# Patient Record
Sex: FEMALE | Race: WHITE | NOT HISPANIC OR LATINO | Employment: FULL TIME | URBAN - METROPOLITAN AREA
[De-identification: names, ages, dates, MRNs, and addresses within clinical notes are randomized per-mention and may not be internally consistent; named-entity substitution may affect disease eponyms.]

---

## 2023-02-14 PROBLEM — R30.0 DYSURIA: Status: ACTIVE | Noted: 2023-02-14

## 2023-02-14 PROBLEM — M25.541 ARTHRALGIA OF BOTH HANDS: Status: ACTIVE | Noted: 2023-02-14

## 2023-02-14 PROBLEM — E61.2 MAGNESIUM DEFICIENCY: Status: ACTIVE | Noted: 2023-02-14

## 2023-02-14 PROBLEM — M79.89 SWELLING OF LEFT LOWER EXTREMITY: Status: ACTIVE | Noted: 2023-02-14

## 2023-02-14 PROBLEM — B02.9 HERPES ZOSTER WITHOUT COMPLICATION: Status: ACTIVE | Noted: 2023-02-14

## 2023-02-14 PROBLEM — N95.1 MENOPAUSAL SYMPTOMS: Status: ACTIVE | Noted: 2023-02-14

## 2023-02-14 PROBLEM — M96.1 FAILED BACK SYNDROME: Status: ACTIVE | Noted: 2023-02-14

## 2023-02-14 PROBLEM — R10.9 ABDOMINAL DISCOMFORT: Status: ACTIVE | Noted: 2023-02-14

## 2023-02-14 PROBLEM — E55.9 VITAMIN D DEFICIENCY: Status: ACTIVE | Noted: 2023-02-14

## 2023-02-14 PROBLEM — M79.7 FIBROMYALGIA: Status: ACTIVE | Noted: 2023-02-14

## 2023-02-14 PROBLEM — M99.09 SEGMENTAL AND SOMATIC DYSFUNCTION: Status: ACTIVE | Noted: 2023-02-14

## 2023-02-14 PROBLEM — M25.542 ARTHRALGIA OF BOTH HANDS: Status: ACTIVE | Noted: 2023-02-14

## 2023-02-14 PROBLEM — J40 BRONCHITIS: Status: ACTIVE | Noted: 2023-02-14

## 2023-02-14 PROBLEM — R13.10 DYSPHAGIA: Status: ACTIVE | Noted: 2023-02-14

## 2023-02-14 PROBLEM — J01.10 ACUTE NON-RECURRENT FRONTAL SINUSITIS: Status: ACTIVE | Noted: 2023-02-14

## 2023-02-14 PROBLEM — H92.01 RIGHT EAR PAIN: Status: ACTIVE | Noted: 2023-02-14

## 2023-02-14 PROBLEM — R53.83 MALAISE AND FATIGUE: Status: ACTIVE | Noted: 2023-02-14

## 2023-02-14 PROBLEM — M25.562 ACUTE PAIN OF LEFT KNEE: Status: ACTIVE | Noted: 2023-02-14

## 2023-02-14 PROBLEM — L30.9 ECZEMA: Status: ACTIVE | Noted: 2023-02-14

## 2023-02-14 PROBLEM — M54.16 LUMBAR RADICULOPATHY, CHRONIC: Status: ACTIVE | Noted: 2023-02-14

## 2023-02-14 PROBLEM — F51.04 CHRONIC INSOMNIA: Status: ACTIVE | Noted: 2023-02-14

## 2023-02-14 PROBLEM — L25.5 RHUS DERMATITIS: Status: ACTIVE | Noted: 2023-02-14

## 2023-02-14 PROBLEM — M54.9 BACK PAIN: Status: RESOLVED | Noted: 2023-02-14 | Resolved: 2023-02-14

## 2023-02-14 PROBLEM — E53.1 VITAMIN B6 DEFICIENCY: Status: ACTIVE | Noted: 2023-02-14

## 2023-02-14 PROBLEM — M47.816 LUMBAR SPONDYLOSIS: Status: RESOLVED | Noted: 2023-02-14 | Resolved: 2023-02-14

## 2023-02-14 PROBLEM — U07.1 COVID-19 VIRUS INFECTION: Status: ACTIVE | Noted: 2023-02-14

## 2023-02-14 PROBLEM — E53.8 VITAMIN B12 DEFICIENCY: Status: ACTIVE | Noted: 2023-02-14

## 2023-02-14 PROBLEM — R53.81 MALAISE AND FATIGUE: Status: ACTIVE | Noted: 2023-02-14

## 2023-02-14 PROBLEM — Z98.82 H/O BREAST IMPLANT: Status: ACTIVE | Noted: 2023-02-14

## 2023-02-14 PROBLEM — M77.11 LATERAL EPICONDYLITIS OF RIGHT ELBOW: Status: ACTIVE | Noted: 2023-02-14

## 2023-02-14 PROBLEM — I10 BENIGN ESSENTIAL HYPERTENSION: Status: ACTIVE | Noted: 2023-02-14

## 2023-03-20 DIAGNOSIS — F51.04 CHRONIC INSOMNIA: Primary | ICD-10-CM

## 2023-03-20 DIAGNOSIS — M25.562 ACUTE PAIN OF LEFT KNEE: ICD-10-CM

## 2023-03-20 RX ORDER — IBUPROFEN 800 MG/1
800 TABLET ORAL EVERY 8 HOURS PRN
Qty: 60 TABLET | Refills: 3 | Status: SHIPPED | OUTPATIENT
Start: 2023-03-20 | End: 2023-04-19

## 2023-03-20 RX ORDER — LORAZEPAM 2 MG/1
2 TABLET ORAL DAILY
Qty: 30 TABLET | Refills: 0 | Status: SHIPPED | OUTPATIENT
Start: 2023-03-20 | End: 2023-04-18 | Stop reason: SDUPTHER

## 2023-04-05 RX ORDER — ESTRADIOL 0.03 MG/D
1 PATCH TRANSDERMAL
COMMUNITY
Start: 2023-02-21 | End: 2023-06-22 | Stop reason: SDUPTHER

## 2023-04-18 ENCOUNTER — APPOINTMENT (OUTPATIENT)
Dept: PRIMARY CARE | Facility: CLINIC | Age: 53
End: 2023-04-18

## 2023-04-18 DIAGNOSIS — F51.04 CHRONIC INSOMNIA: ICD-10-CM

## 2023-04-18 RX ORDER — LORAZEPAM 2 MG/1
2 TABLET ORAL DAILY
Qty: 30 TABLET | Refills: 0 | Status: SHIPPED | OUTPATIENT
Start: 2023-04-18 | End: 2023-05-17 | Stop reason: SDUPTHER

## 2023-04-18 NOTE — TELEPHONE ENCOUNTER
Kinjal is requesting a refill on ativan she is requesting a 90 day supply since she does not come in monthly anymore.

## 2023-05-17 ENCOUNTER — PROCEDURE VISIT (OUTPATIENT)
Dept: PRIMARY CARE | Facility: CLINIC | Age: 53
End: 2023-05-17

## 2023-05-17 VITALS
DIASTOLIC BLOOD PRESSURE: 77 MMHG | HEIGHT: 71 IN | OXYGEN SATURATION: 98 % | BODY MASS INDEX: 22.54 KG/M2 | HEART RATE: 78 BPM | WEIGHT: 161 LBS | TEMPERATURE: 98.1 F | SYSTOLIC BLOOD PRESSURE: 126 MMHG

## 2023-05-17 DIAGNOSIS — M96.1 FAILED BACK SYNDROME: Primary | ICD-10-CM

## 2023-05-17 DIAGNOSIS — F51.04 CHRONIC INSOMNIA: ICD-10-CM

## 2023-05-17 PROCEDURE — 99212 OFFICE O/P EST SF 10 MIN: CPT | Performed by: FAMILY MEDICINE

## 2023-05-17 RX ORDER — IBUPROFEN 600 MG/1
TABLET ORAL
Qty: 90 TABLET | Refills: 3 | Status: SHIPPED | OUTPATIENT
Start: 2023-05-17 | End: 2024-02-13 | Stop reason: ALTCHOICE

## 2023-05-17 RX ORDER — IBUPROFEN 800 MG/1
TABLET ORAL
COMMUNITY
End: 2023-05-17 | Stop reason: SDUPTHER

## 2023-05-17 RX ORDER — LORAZEPAM 2 MG/1
2 TABLET ORAL DAILY
Qty: 30 TABLET | Refills: 0 | Status: SHIPPED | OUTPATIENT
Start: 2023-05-17 | End: 2023-06-13 | Stop reason: SDUPTHER

## 2023-05-17 NOTE — PROGRESS NOTES
Subjective   Patient ID: Kinjal Cleaning is a 53 y.o. female who presents for omt (adjustment) and lorazepam for anxiety  HPI  OARRS:  Bradley Pretty, DO on 5/17/2023  5:04 PM  I have personally reviewed the OARRS report for Kinjal Cleaning. I have considered the risks of abuse, dependence, addiction and diversion    Is the patient prescribed a combination of a benzodiazepine and opioid?  Yes, I feel it is clincially indicated to continue the medication and have discussed with the patient risks/benefits/alternatives.    Last Urine Drug Screen / ordered today: Yes  No results found for this or any previous visit (from the past 18324 hour(s)).  Results are as expected.     Controlled Substance Agreement:  Date of the Last Agreement: 1 year ago and will do next visit  Reviewed Controlled Substance Agreement including but not limited to the benefits, risks, and alternatives to treatment with a Controlled Substance medication(s).    Benzodiazepines:  What is the patient's goal of therapy? Insomnia and panic attack  Is this being achieved with current treatment? yes    SUDARSHAN-7:  No data recorded    Activities of Daily Living:   Is your overall impression that this patient is benefiting (symptom reduction outweighs side effects) from benzodiazepine therapy? Yes     1. Physical Functioning: Better  2. Family Relationship: Same  3. Social Relationship: Same  4. Mood: Same  5. Sleep Patterns: Better  6. Overall Function: Better  Review of Systems    Objective   Physical Exam  General: Patient is alert and orient x3 appears in no acute distress    Heart: Regular rate and rhythm no murmurs clicks or gallops    Lungs: Clear to auscultation bilateral without [place] wheezing  Assessment/Plan   Problem List Items Addressed This Visit       Failed back syndrome - Primary    Relevant Medications    ibuprofen 600 mg tablet    Chronic insomnia    Relevant Medications    LORazepam (Ativan) 2 mg tablet

## 2023-05-23 ENCOUNTER — TELEPHONE (OUTPATIENT)
Dept: PRIMARY CARE | Facility: CLINIC | Age: 53
End: 2023-05-23

## 2023-05-23 NOTE — TELEPHONE ENCOUNTER
Pt left a message states she was here last week and since then she had a tickle in her throat and largitis the last few days, she woke up today congested and is leaving for vacation Thursday. Asking if you would send something in for her.

## 2023-05-24 DIAGNOSIS — J01.10 ACUTE NON-RECURRENT FRONTAL SINUSITIS: Primary | ICD-10-CM

## 2023-05-24 RX ORDER — DOXYCYCLINE 100 MG/1
100 CAPSULE ORAL 2 TIMES DAILY
Qty: 14 CAPSULE | Refills: 0 | Status: SHIPPED | OUTPATIENT
Start: 2023-05-24 | End: 2023-06-03

## 2023-05-24 RX ORDER — METHYLPREDNISOLONE 4 MG/1
TABLET ORAL
Qty: 21 TABLET | Refills: 0 | Status: SHIPPED | OUTPATIENT
Start: 2023-05-24 | End: 2023-05-31

## 2023-06-13 DIAGNOSIS — F51.04 CHRONIC INSOMNIA: ICD-10-CM

## 2023-06-14 RX ORDER — LORAZEPAM 2 MG/1
2 TABLET ORAL DAILY
Qty: 30 TABLET | Refills: 0 | Status: SHIPPED | OUTPATIENT
Start: 2023-06-14 | End: 2023-08-09 | Stop reason: SDUPTHER

## 2023-06-22 DIAGNOSIS — N95.1 MENOPAUSAL SYMPTOMS: Primary | ICD-10-CM

## 2023-06-22 RX ORDER — ESTRADIOL 0.03 MG/D
1 PATCH TRANSDERMAL
Qty: 4 PATCH | Refills: 11 | Status: SHIPPED | OUTPATIENT
Start: 2023-06-22 | End: 2024-05-22 | Stop reason: SDUPTHER

## 2023-08-09 DIAGNOSIS — F51.04 CHRONIC INSOMNIA: ICD-10-CM

## 2023-08-09 RX ORDER — LORAZEPAM 2 MG/1
2 TABLET ORAL DAILY
Qty: 30 TABLET | Refills: 0 | Status: SHIPPED | OUTPATIENT
Start: 2023-08-09 | End: 2023-09-07 | Stop reason: SDUPTHER

## 2023-08-10 ENCOUNTER — TELEPHONE (OUTPATIENT)
Dept: PRIMARY CARE | Facility: CLINIC | Age: 53
End: 2023-08-10

## 2023-09-07 DIAGNOSIS — F51.04 CHRONIC INSOMNIA: ICD-10-CM

## 2023-09-07 RX ORDER — LORAZEPAM 2 MG/1
2 TABLET ORAL DAILY
Qty: 30 TABLET | Refills: 2 | Status: SHIPPED | OUTPATIENT
Start: 2023-09-07 | End: 2024-02-13 | Stop reason: ALTCHOICE

## 2023-10-11 ENCOUNTER — PROCEDURE VISIT (OUTPATIENT)
Dept: PRIMARY CARE | Facility: CLINIC | Age: 53
End: 2023-10-11

## 2023-10-11 VITALS
HEART RATE: 71 BPM | OXYGEN SATURATION: 99 % | BODY MASS INDEX: 22.54 KG/M2 | WEIGHT: 161 LBS | HEIGHT: 71 IN | TEMPERATURE: 97.1 F

## 2023-10-11 DIAGNOSIS — R79.89 ELEVATED BRAIN NATRIURETIC PEPTIDE (BNP) LEVEL: Primary | ICD-10-CM

## 2023-10-11 PROBLEM — N39.46 MIXED INCONTINENCE: Status: ACTIVE | Noted: 2023-10-11

## 2023-10-11 PROBLEM — N81.11 MIDLINE CYSTOCELE: Status: ACTIVE | Noted: 2023-10-11

## 2023-10-11 PROBLEM — N81.3 COMPLETE UTERINE PROLAPSE: Status: ACTIVE | Noted: 2023-10-11

## 2023-10-11 PROCEDURE — 99212 OFFICE O/P EST SF 10 MIN: CPT | Performed by: FAMILY MEDICINE

## 2023-10-11 RX ORDER — LEVOFLOXACIN 500 MG/1
500 TABLET, FILM COATED ORAL DAILY
COMMUNITY
Start: 2023-01-11 | End: 2024-01-04 | Stop reason: SDUPTHER

## 2023-10-11 RX ORDER — LEVONORGESTREL AND ETHINYL ESTRADIOL 0.15-0.03
KIT ORAL
COMMUNITY
End: 2024-02-13 | Stop reason: ALTCHOICE

## 2023-10-11 RX ORDER — PREDNISONE 10 MG/1
TABLET ORAL
COMMUNITY
Start: 2023-02-08 | End: 2024-02-13 | Stop reason: ALTCHOICE

## 2023-10-11 RX ORDER — FLUTICASONE PROPIONATE 50 MCG
SPRAY, SUSPENSION (ML) NASAL
COMMUNITY
Start: 2023-03-22 | End: 2024-02-13 | Stop reason: ALTCHOICE

## 2023-10-11 RX ORDER — DULOXETIN HYDROCHLORIDE 20 MG/1
1 CAPSULE, DELAYED RELEASE ORAL EVERY 12 HOURS
COMMUNITY
End: 2024-02-13 | Stop reason: ALTCHOICE

## 2023-10-11 RX ORDER — NORTRIPTYLINE HYDROCHLORIDE 25 MG/1
CAPSULE ORAL
COMMUNITY
End: 2024-02-13 | Stop reason: ALTCHOICE

## 2023-10-11 NOTE — PROGRESS NOTES
Subjective   Patient ID: Kinjal Cleaning is a 53 y.o. female who presents for omt (adjustment) and lorazepam for anxiety  HPI  Life insurance policy  Patient is having heavy achy feeling in chest.    SOB but with exertion and recovers quick.      Objective   Physical Exam  General: Patient is alert and orient x3 appears in no acute distress    Neck: no JVD    Heart: Regular rate and rhythm no murmurs clicks or gallops    Lungs: Clear to auscultation bilateral without [place] wheezing    Edema: NO C/C/E  Assessment/Plan   Problem List Items Addressed This Visit    None  No signs of heart failure  Repeat BNP in 3 months  Echo in 3 months

## 2023-11-20 ENCOUNTER — TELEPHONE (OUTPATIENT)
Dept: PRIMARY CARE | Facility: CLINIC | Age: 53
End: 2023-11-20

## 2023-11-20 DIAGNOSIS — J40 BRONCHITIS: Primary | ICD-10-CM

## 2023-11-20 RX ORDER — DOXYCYCLINE 100 MG/1
100 CAPSULE ORAL 2 TIMES DAILY
Qty: 14 CAPSULE | Refills: 0 | Status: SHIPPED | OUTPATIENT
Start: 2023-11-20 | End: 2023-11-27

## 2023-11-20 RX ORDER — METHYLPREDNISOLONE 4 MG/1
TABLET ORAL
Qty: 21 TABLET | Refills: 0 | Status: SHIPPED | OUTPATIENT
Start: 2023-11-20 | End: 2023-11-27

## 2023-11-20 NOTE — TELEPHONE ENCOUNTER
----- Message from Bradley Pretty, DO sent at 11/20/2023 10:11 AM EST -----  Please let meredith know I sent medication to the [facility] drug pharmacy in [place]

## 2023-11-20 NOTE — TELEPHONE ENCOUNTER
----- Message from Bradley Pretty, DO sent at 11/20/2023 10:11 AM EST -----  Please let meredith know I sent medication to the [facility] drug pharmacy in [place]

## 2023-12-18 DIAGNOSIS — J01.10 ACUTE NON-RECURRENT FRONTAL SINUSITIS: Primary | ICD-10-CM

## 2023-12-18 RX ORDER — METHYLPREDNISOLONE 4 MG/1
TABLET ORAL
Qty: 21 TABLET | Refills: 0 | Status: SHIPPED | OUTPATIENT
Start: 2023-12-18 | End: 2023-12-25

## 2023-12-18 RX ORDER — DOXYCYCLINE 100 MG/1
100 CAPSULE ORAL 2 TIMES DAILY
Qty: 20 CAPSULE | Refills: 0 | Status: SHIPPED | OUTPATIENT
Start: 2023-12-18 | End: 2023-12-28

## 2023-12-29 ENCOUNTER — TELEPHONE (OUTPATIENT)
Dept: PRIMARY CARE | Facility: CLINIC | Age: 53
End: 2023-12-29

## 2023-12-29 DIAGNOSIS — J01.10 ACUTE NON-RECURRENT FRONTAL SINUSITIS: Primary | ICD-10-CM

## 2023-12-29 NOTE — TELEPHONE ENCOUNTER
"Patient was prescribed Doxycycline and Medrol dose pack on December 18th for a sinus infection. It has helped some but she is still having drainage, random right ear pain and when she blows her nose that ear \"pops\". When she gets up/down she gets dizzy. Recommendations?   "

## 2024-01-04 RX ORDER — LEVOFLOXACIN 500 MG/1
500 TABLET, FILM COATED ORAL DAILY
Qty: 7 TABLET | Refills: 0 | Status: SHIPPED | OUTPATIENT
Start: 2024-01-04 | End: 2024-01-11

## 2024-02-13 ENCOUNTER — PROCEDURE VISIT (OUTPATIENT)
Dept: PRIMARY CARE | Facility: CLINIC | Age: 54
End: 2024-02-13

## 2024-02-13 VITALS
TEMPERATURE: 97.8 F | OXYGEN SATURATION: 98 % | DIASTOLIC BLOOD PRESSURE: 84 MMHG | SYSTOLIC BLOOD PRESSURE: 130 MMHG | BODY MASS INDEX: 22.54 KG/M2 | HEIGHT: 71 IN | HEART RATE: 95 BPM | WEIGHT: 161 LBS

## 2024-02-13 DIAGNOSIS — M96.1 FAILED BACK SYNDROME: Primary | ICD-10-CM

## 2024-02-13 PROCEDURE — 99212 OFFICE O/P EST SF 10 MIN: CPT | Performed by: FAMILY MEDICINE

## 2024-02-13 ASSESSMENT — PATIENT HEALTH QUESTIONNAIRE - PHQ9
2. FEELING DOWN, DEPRESSED OR HOPELESS: NOT AT ALL
1. LITTLE INTEREST OR PLEASURE IN DOING THINGS: NOT AT ALL
SUM OF ALL RESPONSES TO PHQ9 QUESTIONS 1 AND 2: 0

## 2024-02-13 NOTE — PROGRESS NOTES
Subjective   Patient ID: Kinjal Cleaning is a 54 y.o. female who presents for omt (Adjustment ).  HPI  Patient is coming office today with a chief complaint of low back pain.  No new injuries.  General: Patient is alert and orient x 3 appears in no acute distress    Heart: Regular rate and rhythm no murmurs clicks or gallops    Lungs: Clear to auscultation bilaterally without rhonchi rales or wheezing    Musculoskeletal:  Review of Systems    Objective   Physical Exam  Strength grossly intact at 5 out of 5.  Increased tension throughout the lumbar region  Assessment/Plan   Problem List Items Addressed This Visit       Failed back syndrome - Primary   Osteopathic manipulative therapy was utilized.  Patient tolerated the procedure well.

## 2024-04-25 ENCOUNTER — PROCEDURE VISIT (OUTPATIENT)
Dept: PRIMARY CARE | Facility: CLINIC | Age: 54
End: 2024-04-25

## 2024-04-25 VITALS
BODY MASS INDEX: 22.54 KG/M2 | DIASTOLIC BLOOD PRESSURE: 80 MMHG | SYSTOLIC BLOOD PRESSURE: 110 MMHG | HEART RATE: 103 BPM | TEMPERATURE: 97.3 F | HEIGHT: 71 IN | WEIGHT: 161 LBS | OXYGEN SATURATION: 98 %

## 2024-04-25 DIAGNOSIS — M54.2 CERVICALGIA: Primary | ICD-10-CM

## 2024-04-25 DIAGNOSIS — N95.1 MENOPAUSAL SYMPTOMS: ICD-10-CM

## 2024-04-25 DIAGNOSIS — M96.1 FAILED BACK SYNDROME: ICD-10-CM

## 2024-04-25 PROCEDURE — 99213 OFFICE O/P EST LOW 20 MIN: CPT | Performed by: FAMILY MEDICINE

## 2024-04-25 RX ORDER — IBUPROFEN 800 MG/1
800 TABLET ORAL EVERY 8 HOURS PRN
COMMUNITY
End: 2024-04-25 | Stop reason: SDUPTHER

## 2024-04-25 RX ORDER — IBUPROFEN 800 MG/1
800 TABLET ORAL EVERY 8 HOURS PRN
Qty: 90 TABLET | Refills: 0 | Status: SHIPPED | OUTPATIENT
Start: 2024-04-25 | End: 2024-05-25

## 2024-04-25 RX ORDER — MUPIROCIN 20 MG/G
OINTMENT TOPICAL
COMMUNITY
End: 2024-04-25 | Stop reason: SDUPTHER

## 2024-04-25 RX ORDER — MUPIROCIN 20 MG/G
OINTMENT TOPICAL
Qty: 30 G | Refills: 1 | Status: SHIPPED | OUTPATIENT
Start: 2024-04-25

## 2024-04-25 ASSESSMENT — PATIENT HEALTH QUESTIONNAIRE - PHQ9
1. LITTLE INTEREST OR PLEASURE IN DOING THINGS: NOT AT ALL
SUM OF ALL RESPONSES TO PHQ9 QUESTIONS 1 AND 2: 0
2. FEELING DOWN, DEPRESSED OR HOPELESS: NOT AT ALL

## 2024-04-25 NOTE — PROGRESS NOTES
Subjective   Patient ID: Kinjal Cleaning is a 54 y.o. female who presents for omt (adjustment).  HPI  Neck is having more pain and is worse when lifting neck.  Pain on both sides and has fusion C4-C6.  She feels like she has to lift her head.  No pain nubness or shooting down the arms.   Gaining weight.  She wakes in the AM and feels like she is puffy in the AM.  Has gained 25 pounds.  She is still hiking.  She is sitting more at home.  Menopause.  She is trying to drink more water.  She is having hot flashes.   She is not following any eating lifestyle right now.    Review of Systems    Objective   Physical Exam  General: Patient is alert and orient x3 and appears in no acute distress.  Some pain distress.    Neck: Decreased range of motion in all planes    Heart: Regular rate and rhythm no murmurs clicks or gallops    Lungs: Clear to auscultation bilaterally without rhonchi rales or wheezing      Musculoskeletal: Strength was grossly intact.  Deep tendon reflexes intact.  Sensation intact.  Decreased range of motion    Osteopathic structural:  In the head region there is increased suboccipital tension  In the cervical region C2 extended rotated right side bent left  In the rib region first rib on the left was tender to palpation resisted exhalation  In the upper extremity  right upper extremity was protracted inferior tension of pectoralis minor  In the thoracic region  T2 was extended rotated right side bent right, T7 flexed rotated right side bent right  In the lumbar region L5 extended rotated right side bent right  In the pelvic region  positive pelvic compression test on the right, ASIS inferior the right, PSIS superior on the right  In the sacral region  posterior sacral sulcus on the left, MARIA G posterior on the left, negative spring test  In the lower extremity SCS tenderpoint left piriformis    Assessment/Plan   Problem List Items Addressed This Visit    None  Consiudering sleepo study  Increase the water  Try  menopausal formula.

## 2024-05-22 ENCOUNTER — PROCEDURE VISIT (OUTPATIENT)
Dept: PRIMARY CARE | Facility: CLINIC | Age: 54
End: 2024-05-22

## 2024-05-22 VITALS
HEIGHT: 71 IN | RESPIRATION RATE: 14 BRPM | HEART RATE: 76 BPM | BODY MASS INDEX: 22.54 KG/M2 | SYSTOLIC BLOOD PRESSURE: 122 MMHG | WEIGHT: 161 LBS | OXYGEN SATURATION: 98 % | DIASTOLIC BLOOD PRESSURE: 84 MMHG

## 2024-05-22 DIAGNOSIS — M96.1 FAILED BACK SYNDROME: Primary | ICD-10-CM

## 2024-05-22 DIAGNOSIS — M79.7 FIBROMYALGIA: ICD-10-CM

## 2024-05-22 DIAGNOSIS — N95.1 MENOPAUSAL SYMPTOMS: ICD-10-CM

## 2024-05-22 PROCEDURE — 99213 OFFICE O/P EST LOW 20 MIN: CPT | Performed by: FAMILY MEDICINE

## 2024-05-22 RX ORDER — ESTRADIOL 0.03 MG/D
1 PATCH TRANSDERMAL
Qty: 4 PATCH | Refills: 11 | Status: SHIPPED | OUTPATIENT
Start: 2024-05-26 | End: 2025-04-27

## 2024-05-22 NOTE — PROGRESS NOTES
Subjective   Patient ID: Kinjal Cleaning is a 54 y.o. female who presents for Back Pain.  HPI  Still having back and neck pain.    Review of Systems    Objective   Physical Exam  General: Patient is alert and orient x3 and appears in no acute distress.  Some pain distress.    Neck: Decreased range of motion in all planes    Heart: Regular rate and rhythm no murmurs clicks or gallops    Lungs: Clear to auscultation bilaterally without rhonchi rales or wheezing      Musculoskeletal: Strength was grossly intact.  Deep tendon reflexes intact.  Sensation intact.  Decreased range of motion    Osteopathic structural:  In the head region there is increased suboccipital tension  In the cervical region C2 extended rotated right side bent left  In the rib region first rib on the left was tender to palpation resisted exhalation  In the upper extremity  right upper extremity was protracted inferior tension of pectoralis minor  In the thoracic region  T2 was extended rotated right side bent right, T7 flexed rotated right side bent right  In the lumbar region L5 extended rotated right side bent right  In the pelvic region  positive pelvic compression test on the right, ASIS inferior the right, PSIS superior on the right  In the sacral region  posterior sacral sulcus on the left, MARIA G posterior on the left, negative spring test  In the lower extremity SCS tenderpoint left piriformis    Assessment/Plan   Problem List Items Addressed This Visit       Failed back syndrome - Primary    Fibromyalgia    Menopausal symptoms    Relevant Medications    estradiol (Climara) 0.025 mg/24 hr patch (Start on 5/26/2024)   Used OMT

## 2024-05-28 ENCOUNTER — TELEPHONE (OUTPATIENT)
Dept: PRIMARY CARE | Facility: CLINIC | Age: 54
End: 2024-05-28

## 2024-05-28 DIAGNOSIS — L23.7 POISON IVY DERMATITIS: Primary | ICD-10-CM

## 2024-05-28 RX ORDER — TRIAMCINOLONE ACETONIDE 1 MG/G
CREAM TOPICAL 2 TIMES DAILY
Qty: 30 G | Refills: 0 | Status: SHIPPED | OUTPATIENT
Start: 2024-05-28

## 2024-05-28 RX ORDER — PREDNISONE 10 MG/1
TABLET ORAL DAILY
Qty: 42 TABLET | Refills: 0 | Status: SHIPPED | OUTPATIENT
Start: 2024-05-28 | End: 2024-06-03

## 2024-05-28 NOTE — TELEPHONE ENCOUNTER
Patient was doing yard work this weekend and woke up with poison ivy this morning on her right arm. Patient is asking if you can call something in for her.     [facility], [place]

## 2024-05-28 NOTE — TELEPHONE ENCOUNTER
Pharmacy lm stating prednisone qty was sent for 42 tablets but it should only be 21, do you want to change qty or was it so she could have extra tabs on hand?

## 2024-06-19 ENCOUNTER — APPOINTMENT (OUTPATIENT)
Dept: PRIMARY CARE | Facility: CLINIC | Age: 54
End: 2024-06-19

## 2024-06-19 VITALS
HEART RATE: 88 BPM | BODY MASS INDEX: 22.54 KG/M2 | DIASTOLIC BLOOD PRESSURE: 86 MMHG | OXYGEN SATURATION: 98 % | TEMPERATURE: 98 F | WEIGHT: 161 LBS | HEIGHT: 71 IN | SYSTOLIC BLOOD PRESSURE: 122 MMHG

## 2024-06-19 DIAGNOSIS — M96.1 FAILED BACK SYNDROME: Primary | ICD-10-CM

## 2024-06-19 DIAGNOSIS — M54.2 CERVICALGIA: ICD-10-CM

## 2024-06-19 DIAGNOSIS — M79.7 FIBROMYALGIA: ICD-10-CM

## 2024-06-19 PROCEDURE — 99213 OFFICE O/P EST LOW 20 MIN: CPT | Performed by: FAMILY MEDICINE

## 2024-06-19 ASSESSMENT — PATIENT HEALTH QUESTIONNAIRE - PHQ9
1. LITTLE INTEREST OR PLEASURE IN DOING THINGS: NOT AT ALL
2. FEELING DOWN, DEPRESSED OR HOPELESS: NOT AT ALL
SUM OF ALL RESPONSES TO PHQ9 QUESTIONS 1 AND 2: 0

## 2024-06-24 NOTE — PROGRESS NOTES
Subjective   Patient ID: Kinjal Cleaning is a 54 y.o. female who presents for omt (Adjustment back issues).  HPI  Still having back and neck pain.    Review of Systems    Objective   Physical Exam  General: Patient is alert and orient x3 and appears in no acute distress.  Some pain distress.    Neck: Decreased range of motion in all planes    Heart: Regular rate and rhythm no murmurs clicks or gallops    Lungs: Clear to auscultation bilaterally without rhonchi rales or wheezing      Musculoskeletal: Strength was grossly intact.  Deep tendon reflexes intact.  Sensation intact.  Decreased range of motion    Osteopathic structural:  In the head region there is increased suboccipital tension  In the cervical region C2 extended rotated right side bent left  In the rib region first rib on the left was tender to palpation resisted exhalation  In the upper extremity  right upper extremity was protracted inferior tension of pectoralis minor  In the thoracic region  T2 was extended rotated right side bent right, T7 flexed rotated right side bent right  In the lumbar region L5 extended rotated right side bent right  In the pelvic region  positive pelvic compression test on the right, ASIS inferior the right, PSIS superior on the right  In the sacral region  posterior sacral sulcus on the left, MARIA G posterior on the left, negative spring test  In the lower extremity SCS tenderpoint left piriformis    Assessment/Plan   Problem List Items Addressed This Visit       Failed back syndrome - Primary    Fibromyalgia     Other Visit Diagnoses       Cervicalgia              Used OMT

## 2024-07-15 ENCOUNTER — APPOINTMENT (OUTPATIENT)
Dept: PRIMARY CARE | Facility: CLINIC | Age: 54
End: 2024-07-15

## 2024-08-22 ENCOUNTER — APPOINTMENT (OUTPATIENT)
Dept: PRIMARY CARE | Facility: CLINIC | Age: 54
End: 2024-08-22

## 2024-08-22 VITALS
TEMPERATURE: 98.1 F | BODY MASS INDEX: 22.54 KG/M2 | OXYGEN SATURATION: 97 % | SYSTOLIC BLOOD PRESSURE: 126 MMHG | WEIGHT: 161 LBS | HEIGHT: 71 IN | HEART RATE: 68 BPM | DIASTOLIC BLOOD PRESSURE: 76 MMHG

## 2024-08-22 DIAGNOSIS — M96.1 FAILED BACK SYNDROME: Primary | ICD-10-CM

## 2024-08-22 DIAGNOSIS — M79.7 FIBROMYALGIA: ICD-10-CM

## 2024-08-22 PROCEDURE — 99213 OFFICE O/P EST LOW 20 MIN: CPT | Performed by: FAMILY MEDICINE

## 2024-08-22 RX ORDER — LEVONORGESTREL AND ETHINYL ESTRADIOL 0.15-0.03
KIT ORAL
COMMUNITY

## 2024-08-22 NOTE — PROGRESS NOTES
Subjective   Patient ID: Kinjal Cleaning is a 54 y.o. female who presents for Back Pain (Adjustment for her back and neck pain).  HPI  Still having back and neck pain.    Review of Systems    Objective   Physical Exam  General: Patient is alert and orient x3 and appears in no acute distress.  Some pain distress.    Neck: Decreased range of motion in all planes    Heart: Regular rate and rhythm no murmurs clicks or gallops    Lungs: Clear to auscultation bilaterally without rhonchi rales or wheezing      Musculoskeletal: Strength was grossly intact.  Deep tendon reflexes intact.  Sensation intact.  Decreased range of motion    Osteopathic structural:  In the head region there is increased suboccipital tension  In the cervical region C2 extended rotated right side bent left  In the rib region first rib on the left was tender to palpation resisted exhalation  In the upper extremity  right upper extremity was protracted inferior tension of pectoralis minor  In the thoracic region  T2 was extended rotated right side bent right, T7 flexed rotated right side bent right  In the lumbar region L5 extended rotated right side bent right  In the pelvic region  positive pelvic compression test on the right, ASIS inferior the right, PSIS superior on the right  In the sacral region  posterior sacral sulcus on the left, MARIA G posterior on the left, negative spring test  In the lower extremity SCS tenderpoint left piriformis    Assessment/Plan   Problem List Items Addressed This Visit    None      Used OMT

## 2024-09-23 ENCOUNTER — APPOINTMENT (OUTPATIENT)
Dept: PRIMARY CARE | Facility: CLINIC | Age: 54
End: 2024-09-23

## 2024-09-23 DIAGNOSIS — N95.1 MENOPAUSAL SYMPTOMS: ICD-10-CM

## 2024-09-23 RX ORDER — PROGESTERONE, MICRONIZED 100 %
POWDER (GRAM) MISCELLANEOUS
Qty: 3 G | Refills: 3 | Status: SHIPPED | OUTPATIENT
Start: 2024-09-23 | End: 2024-09-26 | Stop reason: SDUPTHER

## 2024-09-23 RX ORDER — ESTRADIOL 0.04 MG/D
1 PATCH TRANSDERMAL WEEKLY
Qty: 4 PATCH | Refills: 11 | Status: SHIPPED | OUTPATIENT
Start: 2024-09-23 | End: 2025-09-23

## 2024-09-23 NOTE — TELEPHONE ENCOUNTER
Pt lm wondering if you would send a script in for her she is having flu like symptoms that started over the weekend, stuffy nose, cough

## 2024-09-26 RX ORDER — PROGESTERONE, MICRONIZED 100 %
POWDER (GRAM) MISCELLANEOUS
Qty: 3 G | Refills: 3 | Status: SHIPPED | OUTPATIENT
Start: 2024-09-26

## 2024-09-26 NOTE — TELEPHONE ENCOUNTER
Pt left another message stating she has fluid in her ears and is requesting an antibiotic, also her progesterone was sent to a pharmacy in [place] and they can't ship to [place].

## 2024-10-09 DIAGNOSIS — J01.10 ACUTE NON-RECURRENT FRONTAL SINUSITIS: Primary | ICD-10-CM

## 2024-10-09 RX ORDER — DOXYCYCLINE 100 MG/1
100 CAPSULE ORAL 2 TIMES DAILY
Qty: 14 CAPSULE | Refills: 0 | Status: SHIPPED | OUTPATIENT
Start: 2024-10-09 | End: 2024-10-16

## 2024-10-21 ENCOUNTER — APPOINTMENT (OUTPATIENT)
Dept: PRIMARY CARE | Facility: CLINIC | Age: 54
End: 2024-10-21

## 2024-10-23 ENCOUNTER — APPOINTMENT (OUTPATIENT)
Dept: PRIMARY CARE | Facility: CLINIC | Age: 54
End: 2024-10-23

## 2024-10-23 VITALS
SYSTOLIC BLOOD PRESSURE: 142 MMHG | WEIGHT: 161 LBS | RESPIRATION RATE: 16 BRPM | HEART RATE: 78 BPM | OXYGEN SATURATION: 98 % | BODY MASS INDEX: 22.54 KG/M2 | HEIGHT: 71 IN | DIASTOLIC BLOOD PRESSURE: 88 MMHG

## 2024-10-23 DIAGNOSIS — M79.7 FIBROMYALGIA: ICD-10-CM

## 2024-10-23 DIAGNOSIS — M54.2 CERVICALGIA: ICD-10-CM

## 2024-10-23 DIAGNOSIS — M96.1 FAILED BACK SYNDROME: Primary | ICD-10-CM

## 2024-10-23 PROBLEM — J32.9 CHRONIC SINUSITIS: Status: ACTIVE | Noted: 2023-02-27

## 2024-10-23 PROCEDURE — 99213 OFFICE O/P EST LOW 20 MIN: CPT | Performed by: FAMILY MEDICINE

## 2024-10-23 RX ORDER — IBUPROFEN 800 MG/1
800 TABLET ORAL EVERY 8 HOURS PRN
COMMUNITY

## 2024-10-23 ASSESSMENT — ENCOUNTER SYMPTOMS: BACK PAIN: 1

## 2024-10-23 NOTE — PROGRESS NOTES
Subjective   Patient ID: Kinjal Cleaning is a 54 y.o. female who presents for Back Pain.  Back Pain  This is a chronic problem. The current episode started more than 1 month ago. The problem occurs daily. The problem has been waxing and waning since onset. The pain is present in the lumbar spine and thoracic spine. The pain is at a severity of 5/10.       Review of Systems   Musculoskeletal:  Positive for back pain.       Objective   Physical Exam  General: Patient is alert and orient x3 and appears in no acute distress.  Some pain distress.    Neck: Decreased range of motion in all planes    Heart: Regular rate and rhythm no murmurs clicks or gallops    Lungs: Clear to auscultation bilaterally without rhonchi rales or wheezing      Musculoskeletal: Strength was grossly intact.  Deep tendon reflexes intact.  Sensation intact.  Decreased range of motion    Osteopathic structural:  In the head region there is increased suboccipital tension  In the cervical region C2 extended rotated right side bent left  In the rib region first rib on the left was tender to palpation resisted exhalation  In the upper extremity  right upper extremity was protracted inferior tension of pectoralis minor  In the thoracic region  T2 was extended rotated right side bent right, T7 flexed rotated right side bent right  In the lumbar region L5 extended rotated right side bent right  In the pelvic region  positive pelvic compression test on the right, ASIS inferior the right, PSIS superior on the right  In the sacral region  posterior sacral sulcus on the left, MARIA G posterior on the left, negative spring test    Assessment/Plan   Problem List Items Addressed This Visit    None  Osteopathic manipulative therapy was utilized  In the head region as suboccipital release  In the cervical region as functional positional release  In the upper extremity as muscle energy  In the rib region as functional positional release  In the Thoracics as high velocity  low amplitude thrust and muscle energy  In the lumbar region as functional positional release  In the pelvic region as muscle energy  In the sacral region as muscle energy      Patient tolerated the procedure well and noticed improvement after the treatment.    Instructed to drink plenty of water

## 2024-11-18 ENCOUNTER — APPOINTMENT (OUTPATIENT)
Dept: PRIMARY CARE | Facility: CLINIC | Age: 54
End: 2024-11-18

## 2024-11-18 VITALS
TEMPERATURE: 97.1 F | DIASTOLIC BLOOD PRESSURE: 74 MMHG | HEIGHT: 71 IN | SYSTOLIC BLOOD PRESSURE: 128 MMHG | BODY MASS INDEX: 22.54 KG/M2 | HEART RATE: 90 BPM | OXYGEN SATURATION: 95 % | WEIGHT: 161 LBS

## 2024-11-18 DIAGNOSIS — M79.7 FIBROMYALGIA: ICD-10-CM

## 2024-11-18 DIAGNOSIS — M96.1 FAILED BACK SYNDROME: Primary | ICD-10-CM

## 2024-11-18 PROCEDURE — 99213 OFFICE O/P EST LOW 20 MIN: CPT | Performed by: FAMILY MEDICINE

## 2024-11-18 ASSESSMENT — PATIENT HEALTH QUESTIONNAIRE - PHQ9
2. FEELING DOWN, DEPRESSED OR HOPELESS: NOT AT ALL
SUM OF ALL RESPONSES TO PHQ9 QUESTIONS 1 AND 2: 0
1. LITTLE INTEREST OR PLEASURE IN DOING THINGS: NOT AT ALL

## 2024-11-19 DIAGNOSIS — M54.2 CERVICALGIA: ICD-10-CM

## 2024-11-19 RX ORDER — MUPIROCIN 20 MG/G
OINTMENT TOPICAL
Qty: 22 G | Refills: 1 | Status: SHIPPED | OUTPATIENT
Start: 2024-11-19

## 2024-11-19 ASSESSMENT — ENCOUNTER SYMPTOMS: BACK PAIN: 1

## 2024-11-19 NOTE — PROGRESS NOTES
Subjective   Patient ID: Kinjal Cleaning is a 54 y.o. female who presents for Back Pain (Adjustment for back pain).  Back Pain  This is a chronic problem. The current episode started more than 1 month ago. The problem occurs daily. The problem has been waxing and waning since onset. The pain is present in the lumbar spine and thoracic spine. The pain is at a severity of 5/10.       Review of Systems   Musculoskeletal:  Positive for back pain.       Objective   Physical Exam  General: Patient is alert and orient x3 and appears in no acute distress.  Some pain distress.    Neck: Decreased range of motion in all planes    Heart: Regular rate and rhythm no murmurs clicks or gallops    Lungs: Clear to auscultation bilaterally without rhonchi rales or wheezing      Musculoskeletal: Strength was grossly intact.  Deep tendon reflexes intact.  Sensation intact.  Decreased range of motion    Osteopathic structural:  In the head region there is increased suboccipital tension  In the cervical region C2 extended rotated right side bent left  In the rib region first rib on the left was tender to palpation resisted exhalation  In the upper extremity  right upper extremity was protracted inferior tension of pectoralis minor  In the thoracic region  T2 was extended rotated right side bent right, T7 flexed rotated right side bent right  In the lumbar region L5 extended rotated right side bent right  In the pelvic region  positive pelvic compression test on the right, ASIS inferior the right, PSIS superior on the right  In the sacral region  posterior sacral sulcus on the left, MARIA G posterior on the left, negative spring test    Assessment/Plan   Problem List Items Addressed This Visit       Failed back syndrome - Primary    Fibromyalgia   Osteopathic manipulative therapy was utilized  In the head region as suboccipital release  In the cervical region as functional positional release  In the upper extremity as muscle energy  In the rib  region as functional positional release  In the Thoracics as high velocity low amplitude thrust and muscle energy  In the lumbar region as functional positional release  In the pelvic region as muscle energy  In the sacral region as muscle energy      Patient tolerated the procedure well and noticed improvement after the treatment.    Instructed to drink plenty of water

## 2024-12-18 ENCOUNTER — APPOINTMENT (OUTPATIENT)
Dept: PRIMARY CARE | Facility: CLINIC | Age: 54
End: 2024-12-18

## 2024-12-18 VITALS
HEART RATE: 82 BPM | DIASTOLIC BLOOD PRESSURE: 78 MMHG | RESPIRATION RATE: 16 BRPM | HEIGHT: 71 IN | WEIGHT: 161 LBS | SYSTOLIC BLOOD PRESSURE: 116 MMHG | OXYGEN SATURATION: 98 % | BODY MASS INDEX: 22.54 KG/M2

## 2024-12-18 DIAGNOSIS — M79.7 FIBROMYALGIA: ICD-10-CM

## 2024-12-18 DIAGNOSIS — M96.1 FAILED BACK SYNDROME: Primary | ICD-10-CM

## 2024-12-18 PROCEDURE — 99213 OFFICE O/P EST LOW 20 MIN: CPT | Performed by: FAMILY MEDICINE

## 2024-12-20 ASSESSMENT — ENCOUNTER SYMPTOMS: BACK PAIN: 1

## 2025-01-13 ENCOUNTER — APPOINTMENT (OUTPATIENT)
Dept: PRIMARY CARE | Facility: CLINIC | Age: 55
End: 2025-01-13

## 2025-01-13 ENCOUNTER — TELEPHONE (OUTPATIENT)
Dept: PRIMARY CARE | Facility: CLINIC | Age: 55
End: 2025-01-13

## 2025-01-13 VITALS
HEIGHT: 71 IN | TEMPERATURE: 97.6 F | OXYGEN SATURATION: 100 % | DIASTOLIC BLOOD PRESSURE: 86 MMHG | HEART RATE: 64 BPM | WEIGHT: 161 LBS | BODY MASS INDEX: 22.54 KG/M2 | SYSTOLIC BLOOD PRESSURE: 124 MMHG

## 2025-01-13 DIAGNOSIS — M79.7 FIBROMYALGIA: ICD-10-CM

## 2025-01-13 DIAGNOSIS — J40 BRONCHITIS: ICD-10-CM

## 2025-01-13 DIAGNOSIS — J40 BRONCHITIS: Primary | ICD-10-CM

## 2025-01-13 DIAGNOSIS — M54.2 CERVICALGIA: ICD-10-CM

## 2025-01-13 DIAGNOSIS — M96.1 FAILED BACK SYNDROME: Primary | ICD-10-CM

## 2025-01-13 PROCEDURE — 99213 OFFICE O/P EST LOW 20 MIN: CPT | Performed by: FAMILY MEDICINE

## 2025-01-13 RX ORDER — ALBUTEROL SULFATE AND BUDESONIDE 90; 80 UG/1; UG/1
2 AEROSOL, METERED RESPIRATORY (INHALATION) EVERY 6 HOURS PRN
Qty: 5.9 G | Refills: 0 | Status: SHIPPED | OUTPATIENT
Start: 2025-01-13

## 2025-01-13 RX ORDER — DOXYCYCLINE 100 MG/1
100 CAPSULE ORAL 2 TIMES DAILY
Qty: 14 CAPSULE | Refills: 0 | Status: SHIPPED | OUTPATIENT
Start: 2025-01-13 | End: 2025-01-20

## 2025-01-13 ASSESSMENT — ENCOUNTER SYMPTOMS: BACK PAIN: 1

## 2025-01-13 NOTE — PROGRESS NOTES
Subjective   Patient ID: Kinjal Cleaning is a 54 y.o. female who presents for Back Pain (Adjustment for back pain).  Back Pain  This is a chronic problem. The current episode started more than 1 month ago. The problem occurs daily. The problem has been waxing and waning since onset. The pain is present in the lumbar spine and thoracic spine. The pain is at a severity of 5/10.       Review of Systems   Musculoskeletal:  Positive for back pain.       Objective   Physical Exam  General: Patient is alert and orient x3 and appears in no acute distress.  Some pain distress.    Neck: Decreased range of motion in all planes    Heart: Regular rate and rhythm no murmurs clicks or gallops    Lungs: Clear to auscultation bilaterally without rhonchi rales or wheezing      Musculoskeletal: Strength was grossly intact.  Deep tendon reflexes intact.  Sensation intact.  Decreased range of motion    Osteopathic structural:  In the head region there is increased suboccipital tension  In the cervical region C2 extended rotated right side bent left  In the rib region first rib on the left was tender to palpation resisted exhalation  In the upper extremity  right upper extremity was protracted inferior tension of pectoralis minor  In the thoracic region  T2 was extended rotated right side bent right, T7 flexed rotated right side bent right  In the lumbar region L5 extended rotated right side bent right  In the pelvic region  positive pelvic compression test on the right, ASIS inferior the right, PSIS superior on the right  In the sacral region  posterior sacral sulcus on the left, MARIA G posterior on the left, negative spring test    Assessment/Plan   Problem List Items Addressed This Visit       Failed back syndrome - Primary    Fibromyalgia     Other Visit Diagnoses       Cervicalgia              Osteopathic manipulative therapy was utilized  In the head region as suboccipital release  In the cervical region as functional positional  release  In the upper extremity as muscle energy  In the rib region as functional positional release  In the Thoracics as high velocity low amplitude thrust and muscle energy  In the lumbar region as functional positional release  In the pelvic region as muscle energy  In the sacral region as muscle energy      Patient tolerated the procedure well and noticed improvement after the treatment.    Instructed to drink plenty of water

## 2025-01-13 NOTE — TELEPHONE ENCOUNTER
Pt sanket stating the inhaler that was sent in was $500, is there anything else you can send for her?

## 2025-01-14 RX ORDER — ALBUTEROL SULFATE 90 UG/1
2 INHALANT RESPIRATORY (INHALATION) EVERY 4 HOURS PRN
Qty: 8 G | Refills: 0 | Status: SHIPPED | OUTPATIENT
Start: 2025-01-14 | End: 2026-01-14

## 2025-01-14 NOTE — TELEPHONE ENCOUNTER
Kinjal called again stating that the inhaler that you sent in for her and her mom (Soha Navarrete) is gonna cost $500 . She wants to know if there is something else that you could call in for them?

## 2025-02-06 ENCOUNTER — APPOINTMENT (OUTPATIENT)
Dept: PRIMARY CARE | Facility: CLINIC | Age: 55
End: 2025-02-06

## 2025-02-06 DIAGNOSIS — M96.1 FAILED BACK SYNDROME: Primary | ICD-10-CM

## 2025-02-06 DIAGNOSIS — M79.7 FIBROMYALGIA: ICD-10-CM

## 2025-02-06 PROCEDURE — 99213 OFFICE O/P EST LOW 20 MIN: CPT | Performed by: FAMILY MEDICINE

## 2025-02-06 ASSESSMENT — ENCOUNTER SYMPTOMS: BACK PAIN: 1

## 2025-02-06 NOTE — PROGRESS NOTES
Subjective   Patient ID: Kinjal Cleaning is a 54 y.o. female who presents for No chief complaint on file..  Back Pain  This is a chronic problem. The current episode started more than 1 month ago. The problem occurs daily. The problem has been waxing and waning since onset. The pain is present in the lumbar spine and thoracic spine. The pain is at a severity of 5/10.       Review of Systems   Musculoskeletal:  Positive for back pain.       Objective   Physical Exam  General: Patient is alert and orient x3 and appears in no acute distress.  Some pain distress.    Neck: Decreased range of motion in all planes    Heart: Regular rate and rhythm no murmurs clicks or gallops    Lungs: Clear to auscultation bilaterally without rhonchi rales or wheezing      Musculoskeletal: Strength was grossly intact.  Deep tendon reflexes intact.  Sensation intact.  Decreased range of motion    Osteopathic structural:  In the head region there is increased suboccipital tension  In the cervical region C2 extended rotated right side bent left  In the rib region first rib on the left was tender to palpation resisted exhalation  In the upper extremity  right upper extremity was protracted inferior tension of pectoralis minor  In the thoracic region  T2 was extended rotated right side bent right, T7 flexed rotated right side bent right  In the lumbar region L5 extended rotated right side bent right  In the pelvic region  positive pelvic compression test on the right, ASIS inferior the right, PSIS superior on the right  In the sacral region  posterior sacral sulcus on the left, MARIA G posterior on the left, negative spring test    Assessment/Plan   Problem List Items Addressed This Visit       Failed back syndrome - Primary    Fibromyalgia       Osteopathic manipulative therapy was utilized  In the head region as suboccipital release  In the cervical region as functional positional release  In the upper extremity as muscle energy  In the rib region as  functional positional release  In the Thoracics as high velocity low amplitude thrust and muscle energy  In the lumbar region as functional positional release  In the pelvic region as muscle energy  In the sacral region as muscle energy      Patient tolerated the procedure well and noticed improvement after the treatment.    Instructed to drink plenty of water

## 2025-02-13 ENCOUNTER — APPOINTMENT (OUTPATIENT)
Dept: PRIMARY CARE | Facility: CLINIC | Age: 55
End: 2025-02-13

## 2025-02-13 ENCOUNTER — TELEPHONE (OUTPATIENT)
Dept: PRIMARY CARE | Facility: CLINIC | Age: 55
End: 2025-02-13

## 2025-02-13 DIAGNOSIS — R10.9 ABDOMINAL DISCOMFORT: Primary | ICD-10-CM

## 2025-02-13 DIAGNOSIS — J40 BRONCHITIS: ICD-10-CM

## 2025-02-13 RX ORDER — ALBUTEROL SULFATE AND BUDESONIDE 90; 80 UG/1; UG/1
2 AEROSOL, METERED RESPIRATORY (INHALATION) EVERY 6 HOURS PRN
Qty: 5.9 G | Refills: 0 | Status: SHIPPED | OUTPATIENT
Start: 2025-02-13

## 2025-02-13 RX ORDER — DOXYCYCLINE 100 MG/1
100 CAPSULE ORAL 2 TIMES DAILY
Qty: 14 CAPSULE | Refills: 0 | Status: SHIPPED | OUTPATIENT
Start: 2025-02-13 | End: 2025-02-20

## 2025-02-13 NOTE — TELEPHONE ENCOUNTER
Pt lm stating she has been sick since Sunday, she feels like there is rattling in her lungs, she is wondering if you could send something in for her? She has been using her inhaler but it is not really helping.

## 2025-02-14 NOTE — TELEPHONE ENCOUNTER
Patient left a voicemail and stated the pharmacy never got anything from the office with the billing information. I spoke with Kia our drug rep and she advised me that I give them the saving card information and that the pharmacy will still need to call their cooperate office to get an over-ride code. Spoke with the pharmacy advised the pharmacist of this and provided him the savings card info. He took it and stated he will take care of it. Patient advised.

## 2025-02-14 NOTE — TELEPHONE ENCOUNTER
[facility] called and stated he spoke to cooperate and they have to have a primary to bill, spoke with Kia and she directed me to the website to download a different time of savings card. [facility] still couldn't get the claim to go through I faxed her the copy of  the savings card with the instructions on how to submit.

## 2025-03-10 ENCOUNTER — APPOINTMENT (OUTPATIENT)
Dept: PRIMARY CARE | Facility: CLINIC | Age: 55
End: 2025-03-10

## 2025-03-10 VITALS
DIASTOLIC BLOOD PRESSURE: 72 MMHG | BODY MASS INDEX: 22.54 KG/M2 | HEART RATE: 73 BPM | HEIGHT: 71 IN | OXYGEN SATURATION: 99 % | WEIGHT: 161 LBS | TEMPERATURE: 97.8 F | SYSTOLIC BLOOD PRESSURE: 102 MMHG

## 2025-03-10 DIAGNOSIS — M96.1 FAILED BACK SYNDROME: Primary | ICD-10-CM

## 2025-03-10 PROCEDURE — 99213 OFFICE O/P EST LOW 20 MIN: CPT | Performed by: FAMILY MEDICINE

## 2025-03-10 ASSESSMENT — ENCOUNTER SYMPTOMS: BACK PAIN: 1

## 2025-03-10 NOTE — PROGRESS NOTES
Subjective   Patient ID: Kinjal Cleaning is a 55 y.o. female who presents for Back Pain (Adjustment for back pain).  Back Pain  This is a chronic problem. The current episode started more than 1 month ago. The problem occurs daily. The problem has been waxing and waning since onset. The pain is present in the lumbar spine and thoracic spine. The pain is at a severity of 5/10.       Review of Systems   Musculoskeletal:  Positive for back pain.       Objective   Physical Exam  General: Patient is alert and orient x3 and appears in no acute distress.  Some pain distress.    Neck: Decreased range of motion in all planes    Heart: Regular rate and rhythm no murmurs clicks or gallops    Lungs: Clear to auscultation bilaterally without rhonchi rales or wheezing      Musculoskeletal: Strength was grossly intact.  Deep tendon reflexes intact.  Sensation intact.  Decreased range of motion    Osteopathic structural:  In the head region there is increased suboccipital tension  In the cervical region C2 extended rotated right side bent left  In the rib region first rib on the left was tender to palpation resisted exhalation  In the upper extremity  right upper extremity was protracted inferior tension of pectoralis minor  In the thoracic region  T2 was extended rotated right side bent right, T7 flexed rotated right side bent right  In the lumbar region L5 extended rotated right side bent right  In the pelvic region  positive pelvic compression test on the right, ASIS inferior the right, PSIS superior on the right  In the sacral region  posterior sacral sulcus on the left, MARIA G posterior on the left, negative spring test    Assessment/Plan   Problem List Items Addressed This Visit    None        Osteopathic manipulative therapy was utilized  In the head region as suboccipital release  In the cervical region as functional positional release  In the upper extremity as muscle energy  In the rib region as functional positional release  In  the Thoracics as high velocity low amplitude thrust and muscle energy  In the lumbar region as functional positional release  In the pelvic region as muscle energy  In the sacral region as muscle energy      Patient tolerated the procedure well and noticed improvement after the treatment.    Instructed to drink plenty of water

## 2025-04-21 ENCOUNTER — APPOINTMENT (OUTPATIENT)
Dept: PRIMARY CARE | Facility: CLINIC | Age: 55
End: 2025-04-21

## 2025-04-21 VITALS
DIASTOLIC BLOOD PRESSURE: 76 MMHG | WEIGHT: 161 LBS | OXYGEN SATURATION: 97 % | HEIGHT: 71 IN | HEART RATE: 81 BPM | TEMPERATURE: 97.6 F | BODY MASS INDEX: 22.54 KG/M2 | SYSTOLIC BLOOD PRESSURE: 122 MMHG

## 2025-04-21 DIAGNOSIS — M96.1 FAILED BACK SYNDROME: Primary | ICD-10-CM

## 2025-04-21 DIAGNOSIS — N95.1 MENOPAUSAL SYMPTOMS: ICD-10-CM

## 2025-04-21 PROCEDURE — 99213 OFFICE O/P EST LOW 20 MIN: CPT | Performed by: FAMILY MEDICINE

## 2025-04-21 RX ORDER — PROGESTERONE 100 MG/1
CAPSULE ORAL
COMMUNITY
Start: 2025-03-19

## 2025-04-21 RX ORDER — PROGESTERONE, MICRONIZED 100 %
POWDER (GRAM) MISCELLANEOUS
Qty: 3 G | Refills: 3 | Status: SHIPPED | OUTPATIENT
Start: 2025-04-21

## 2025-04-21 ASSESSMENT — ENCOUNTER SYMPTOMS: BACK PAIN: 1

## 2025-04-21 NOTE — PROGRESS NOTES
Subjective   Patient ID: Kinjal Cleaning is a 55 y.o. female who presents for Back Pain (Adjustment for back pain).  Back Pain  This is a chronic problem. The current episode started more than 1 month ago. The problem occurs daily. The problem has been waxing and waning since onset. The pain is present in the lumbar spine and thoracic spine. The pain is at a severity of 5/10.       Review of Systems   Musculoskeletal:  Positive for back pain.       Objective   Physical Exam  General: Patient is alert and orient x3 and appears in no acute distress.  Some pain distress.    Neck: Decreased range of motion in all planes    Heart: Regular rate and rhythm no murmurs clicks or gallops    Lungs: Clear to auscultation bilaterally without rhonchi rales or wheezing      Musculoskeletal: Strength was grossly intact.  Deep tendon reflexes intact.  Sensation intact.  Decreased range of motion    Osteopathic structural:  In the head region there is increased suboccipital tension  In the cervical region C2 extended rotated right side bent left  In the rib region first rib on the left was tender to palpation resisted exhalation  In the upper extremity  right upper extremity was protracted inferior tension of pectoralis minor  In the thoracic region  T2 was extended rotated right side bent right, T7 flexed rotated right side bent right  In the lumbar region L5 extended rotated right side bent right  In the pelvic region  positive pelvic compression test on the right, ASIS inferior the right, PSIS superior on the right  In the sacral region  posterior sacral sulcus on the left, MARIA G posterior on the left, negative spring test    Assessment/Plan   Problem List Items Addressed This Visit       Failed back syndrome - Primary    Menopausal symptoms    Relevant Medications    progesterone micronized 100 % powder powder         Osteopathic manipulative therapy was utilized  In the head region as suboccipital release  In the cervical region as  functional positional release  In the upper extremity as muscle energy  In the rib region as functional positional release  In the Thoracics as high velocity low amplitude thrust and muscle energy  In the lumbar region as functional positional release  In the pelvic region as muscle energy  In the sacral region as muscle energy      Patient tolerated the procedure well and noticed improvement after the treatment.    Instructed to drink plenty of water

## 2025-05-12 ENCOUNTER — APPOINTMENT (OUTPATIENT)
Dept: PRIMARY CARE | Facility: CLINIC | Age: 55
End: 2025-05-12

## 2025-05-21 ENCOUNTER — APPOINTMENT (OUTPATIENT)
Dept: PRIMARY CARE | Facility: CLINIC | Age: 55
End: 2025-05-21

## 2025-05-28 ENCOUNTER — APPOINTMENT (OUTPATIENT)
Dept: PRIMARY CARE | Facility: CLINIC | Age: 55
End: 2025-05-28

## 2025-05-28 VITALS
TEMPERATURE: 97.6 F | WEIGHT: 161 LBS | DIASTOLIC BLOOD PRESSURE: 68 MMHG | BODY MASS INDEX: 22.54 KG/M2 | SYSTOLIC BLOOD PRESSURE: 124 MMHG | HEIGHT: 71 IN | HEART RATE: 72 BPM | OXYGEN SATURATION: 97 %

## 2025-05-28 DIAGNOSIS — M54.2 CERVICALGIA: ICD-10-CM

## 2025-05-28 PROCEDURE — 99213 OFFICE O/P EST LOW 20 MIN: CPT | Performed by: FAMILY MEDICINE

## 2025-05-28 RX ORDER — MUPIROCIN 20 MG/G
OINTMENT TOPICAL 2 TIMES DAILY
Qty: 22 G | Refills: 3 | Status: SHIPPED | OUTPATIENT
Start: 2025-05-28

## 2025-05-28 RX ORDER — IBUPROFEN 800 MG/1
800 TABLET, FILM COATED ORAL EVERY 8 HOURS PRN
Qty: 30 TABLET | Refills: 3 | Status: SHIPPED | OUTPATIENT
Start: 2025-05-28 | End: 2025-06-27

## 2025-05-28 ASSESSMENT — ENCOUNTER SYMPTOMS: BACK PAIN: 1

## 2025-05-28 NOTE — PROGRESS NOTES
Subjective   Patient ID: Kinjal Cleaning is a 55 y.o. female who presents for Back Pain (Adjustment for back pain).  Back Pain  This is a chronic problem. The current episode started more than 1 month ago. The problem occurs daily. The problem has been waxing and waning since onset. The pain is present in the lumbar spine and thoracic spine. The pain is at a severity of 5/10.     History of Present Illness  The patient presents for evaluation of back pain.    She reports experiencing back pain, which she attributes to recent physical activity, specifically gardening. She mentions that she spent approximately 12 hours working outdoors on Saturday, after which she experienced difficulty in rising from a seated position due to stiffness. Additionally, she notes that her back pain was less severe during a recent trip, where the weather was significantly warmer than her current location. In the mornings, she experiences pain upon waking, which was not present during her trip.     She is requesting refills on ibuprofen and mupirocin.     Results       No visits with results within 1 Month(s) from this visit.   Latest known visit with results is:   [facility] Encounter on 05/14/2020   Component Date Value    Pathology Report 05/14/2020                      Value:Name KINJAL CLEANING.                                                                                                   Accession #: [id number]            Pathologist:                   MELANY LE MD  Date of Procedure:    5/14/2020  Date Received:          5/14/2020  Date Reported           5/19/2020  Submitting Physician:   LETTY DENSON MD  Location:                    [facility]  Other External # [id number]                                                                   FINAL DIAGNOSIS  A.  UTERUS, CERVICAL CORE, FALLOPIAN TUBES:    --PROLIFERATIVE PATTERN ENDOMETRIUM  --MYOMETRIUM WITH ADENOMYOSIS  --PORTION OF CERVIX, NO PATHOLOGIC DIAGNOSIS  --FALLOPIAN  "TUBES, NO PATHOLOGIC DIAGNOSIS                                                                                                                                                                                                                                                                                                                                                                                                                                                                                                                 Electronically Signed Out By MELANY LE MD/RWHORTENCIA  By the signature on this report, the individual or group listed as making the  Final Interpretation/Diagnosis certifies that they have reviewed this case.           Clinical History:  YOLANDA, uterovaginal prolapse, cystocele      Specimens Submitted As:  A: UTERUS, CERVICAL CORE (STITCH MARKS ECTOCERVIX), FALLOPIAN TUBES       Other Case Numbers   [id number]  Gross Description:  A:  Received in formalin, labeled with the patient's name and hospital number  and \"uterus, cervical core, tubes\", is a fragmented uterus with fallopian tubes  and cervical core.  The uterus weighs 27 g, and aggregates to 12 x 8.0 x 4.0  cm.  The cervical os is fish-mouth, 1.1 cm.  The ectocervix is pink-tan,  smooth, and glistening.  The endocervical canal is measures 2.3 cm in lengt                          h  and appears normal.  The endometrial cavity is disrupted.  The possible  endometrium measures up to 0.1 cm in greatest thickness.  No gross lesions are  identified.  The myometrium is pink, rubbery and disrupted. The myometrium is  distorted by tan-white intramural nodule with whorled, rubbery, and bulging cut  surfaces. Areas of calcification, necrosis, hemorrhage, softening are not  identified.  The serosal surface is tan-red, smooth, and glistening. Fallopian  tube #1, 6.5 cm in length by 0.7 cm in diameter, has a pink-tan, smooth,  glistening, hyperemic serosal " "surface.  The fimbriated end is unremarkable. The  cut surface reveals a patent lumen. Fallopian tube #2, 7.8 cm in length by 0.6  cm in diameter, has a pink-tan, smooth, glistening, hyperemic serosal surface.   The fimbriated end is unremarkable. The cut surface reveals a patent lumen.  Representative sections are submitted in 7 cassettes.   MY    Summary of Cassettes:  Specimen     Label     Site  A          1     cervi                          x            2     fallopian tube #1, fimbriated end entirely and representative  cross-sections                            3           fallopian tube #2, fimbriated end  entirely and representative cross-sections                            4           possible endometrium                            5-7       representative section of myometrium with  nodules                my/5/15/2020               [facility]  Department of Pathology   [address] [place] [address]        CONVERTED FINAL DIAGNOSIS 05/14/2020                      Value:A.  UTERUS, CERVICAL CORE, FALLOPIAN TUBES:    --PROLIFERATIVE PATTERN ENDOMETRIUM  --MYOMETRIUM WITH ADENOMYOSIS  --PORTION OF CERVIX, NO PATHOLOGIC DIAGNOSIS  --FALLOPIAN TUBES, NO PATHOLOGIC DIAGNOSIS        CONVERTED CLINICAL DIAGN* 05/14/2020                      Value:YOLANDA, uterovaginal prolapse, cystocele        CONVERTED GROSS DESCRIPT* 05/14/2020                      Value:A:  Received in formalin, labeled with the patient's name and hospital number  and \"uterus, cervical core, tubes\", is a fragmented uterus with fallopian tubes  and cervical core.  The uterus weighs 27 g, and aggregates to 12 x 8.0 x 4.0  cm.  The cervical os is fish-mouth, 1.1 cm.  The ectocervix is pink-tan,  smooth, and glistening.  The endocervical canal is measures 2.3 cm in length  and appears normal.  The endometrial cavity is disrupted.  The possible  endometrium measures up to 0.1 cm in greatest thickness.  No " gross lesions are  identified.  The myometrium is pink, rubbery and disrupted. The myometrium is  distorted by tan-white intramural nodule with whorled, rubbery, and bulging cut  surfaces. Areas of calcification, necrosis, hemorrhage, softening are not  identified.  The serosal surface is tan-red, smooth, and glistening. Fallopian  tube #1, 6.5 cm in length by 0.7 cm in diameter, has a pink-tan, smooth,  glistening, hyperemic serosal surface.  The fimbriated end is unrema                          rkable. The  cut surface reveals a patent lumen. Fallopian tube #2, 7.8 cm in length by 0.6  cm in diameter, has a pink-tan, smooth, glistening, hyperemic serosal surface.   The fimbriated end is unremarkable. The cut surface reveals a patent lumen.  Representative sections are submitted in 7 cassettes.   MY    Summary of Cassettes:  Specimen     Label     Site  A          1     cervix            2     fallopian tube #1, fimbriated end entirely and representative  cross-sections                            3           fallopian tube #2, fimbriated end  entirely and representative cross-sections                            4           possible endometrium                            5-7       representative section of myometrium with  nodules                  CONVERTED FINAL REPORT P* 05/14/2020 [id number]         Review of Systems   Musculoskeletal:  Positive for back pain.       Objective   Physical Exam  General: Patient is alert and orient x3 and appears in no acute distress.  Some pain distress.    Neck: Decreased range of motion in all planes    Heart: Regular rate and rhythm no murmurs clicks or gallops    Lungs: Clear to auscultation bilaterally without rhonchi rales or wheezing      Musculoskeletal: Strength was grossly intact.  Deep tendon reflexes intact.  Sensation intact.  Decreased range of motion    Osteopathic structural:  In the head region there is increased suboccipital  tension  In the cervical region C2 extended rotated right side bent left  In the rib region first rib on the left was tender to palpation resisted exhalation  In the upper extremity  right upper extremity was protracted inferior tension of pectoralis minor  In the thoracic region  T2 was extended rotated right side bent right, T7 flexed rotated right side bent right  In the lumbar region L5 extended rotated right side bent right  In the pelvic region  positive pelvic compression test on the right, ASIS inferior the right, PSIS superior on the right  In the sacral region  posterior sacral sulcus on the left, MARIA G posterior on the left, negative spring test    Assessment/Plan   Problem List Items Addressed This Visit    None    Assessment & Plan  1. Back pain.  - Her back pain may be exacerbated by exposure to cold weather and potential allergens in her current environment.  - Reports that her  rubbed her back, which provided some relief.  - Advised to continue using ibuprofen as needed for pain management.  - Prescriptions for ibuprofen and mupirocin have been sent to pharmacy.         Osteopathic manipulative therapy was utilized  In the head region as suboccipital release  In the cervical region as functional positional release  In the upper extremity as muscle energy  In the rib region as functional positional release  In the Thoracics as high velocity low amplitude thrust and muscle energy  In the lumbar region as functional positional release  In the pelvic region as muscle energy  In the sacral region as muscle energy      Patient tolerated the procedure well and noticed improvement after the treatment.    Instructed to drink plenty of water

## 2025-06-12 DIAGNOSIS — Z12.11 SCREENING FOR COLORECTAL CANCER: ICD-10-CM

## 2025-06-12 DIAGNOSIS — Z12.12 SCREENING FOR COLORECTAL CANCER: ICD-10-CM

## 2025-06-16 ENCOUNTER — APPOINTMENT (OUTPATIENT)
Dept: PRIMARY CARE | Facility: CLINIC | Age: 55
End: 2025-06-16

## 2025-06-24 ASSESSMENT — ENCOUNTER SYMPTOMS: BACK PAIN: 1

## 2025-06-24 NOTE — PROGRESS NOTES
Subjective   Patient ID: Kinjal Cleaning is a 55 y.o. female who presents for No chief complaint on file..  Back Pain  This is a chronic problem. The current episode started more than 1 month ago. The problem occurs daily. The problem has been waxing and waning since onset. The pain is present in the lumbar spine and thoracic spine. The pain is at a severity of 5/10.     History of Present Illness  The patient presents for evaluation of back and neck pain.    She reports experiencing discomfort in her back and neck, with no change in her lower back condition. She has not sought the services of a massage therapist for these issues.     Results       No visits with results within 1 Month(s) from this visit.   Latest known visit with results is:   [facility] Encounter on 05/14/2020   Component Date Value    Pathology Report 05/14/2020                      Value:Name KINJAL CLEANING.                                                                                                   Accession #: [id number]            Pathologist:                   MELANY LE MD  Date of Procedure:    5/14/2020  Date Received:          5/14/2020  Date Reported           5/19/2020  Submitting Physician:   LETTY DENSON MD  Location:                    [facility]  Other External # [id number]                                                                   FINAL DIAGNOSIS  A.  UTERUS, CERVICAL CORE, FALLOPIAN TUBES:    --PROLIFERATIVE PATTERN ENDOMETRIUM  --MYOMETRIUM WITH ADENOMYOSIS  --PORTION OF CERVIX, NO PATHOLOGIC DIAGNOSIS  --FALLOPIAN TUBES, NO PATHOLOGIC DIAGNOSIS                                                                                                                                                                                                                                                                                                                                                                                             "                                                                                                                     Electronically Signed Out By MELANY LE MD/WILEY  By the signature on this report, the individual or group listed as making the  Final Interpretation/Diagnosis certifies that they have reviewed this case.           Clinical History:  YOLANDA, uterovaginal prolapse, cystocele      Specimens Submitted As:  A: UTERUS, CERVICAL CORE (STITCH MARKS ECTOCERVIX), FALLOPIAN TUBES       Other Case Numbers   [id number]  Gross Description:  A:  Received in formalin, labeled with the patient's name and hospital number  and \"uterus, cervical core, tubes\", is a fragmented uterus with fallopian tubes  and cervical core.  The uterus weighs 27 g, and aggregates to 12 x 8.0 x 4.0  cm.  The cervical os is fish-mouth, 1.1 cm.  The ectocervix is pink-tan,  smooth, and glistening.  The endocervical canal is measures 2.3 cm in lengt                          h  and appears normal.  The endometrial cavity is disrupted.  The possible  endometrium measures up to 0.1 cm in greatest thickness.  No gross lesions are  identified.  The myometrium is pink, rubbery and disrupted. The myometrium is  distorted by tan-white intramural nodule with whorled, rubbery, and bulging cut  surfaces. Areas of calcification, necrosis, hemorrhage, softening are not  identified.  The serosal surface is tan-red, smooth, and glistening. Fallopian  tube #1, 6.5 cm in length by 0.7 cm in diameter, has a pink-tan, smooth,  glistening, hyperemic serosal surface.  The fimbriated end is unremarkable. The  cut surface reveals a patent lumen. Fallopian tube #2, 7.8 cm in length by 0.6  cm in diameter, has a pink-tan, smooth, glistening, hyperemic serosal surface.   The fimbriated end is unremarkable. The cut surface reveals a patent lumen.  Representative sections are submitted in 7 cassettes.   MY    Summary of Cassettes:  Specimen     Label     Site  A       " "   1     cervi                          x            2     fallopian tube #1, fimbriated end entirely and representative  cross-sections                            3           fallopian tube #2, fimbriated end  entirely and representative cross-sections                            4           possible endometrium                            5-7       representative section of myometrium with  nodules                my/5/15/2020               [facility]  Department of Pathology   [id number] [place] [address]        CONVERTED FINAL DIAGNOSIS 05/14/2020                      Value:A.  UTERUS, CERVICAL CORE, FALLOPIAN TUBES:    --PROLIFERATIVE PATTERN ENDOMETRIUM  --MYOMETRIUM WITH ADENOMYOSIS  --PORTION OF CERVIX, NO PATHOLOGIC DIAGNOSIS  --FALLOPIAN TUBES, NO PATHOLOGIC DIAGNOSIS        CONVERTED CLINICAL DIAGN* 05/14/2020                      Value:YOLANDA, uterovaginal prolapse, cystocele        CONVERTED GROSS DESCRIPT* 05/14/2020                      Value:A:  Received in formalin, labeled with the patient's name and hospital number  and \"uterus, cervical core, tubes\", is a fragmented uterus with fallopian tubes  and cervical core.  The uterus weighs 27 g, and aggregates to 12 x 8.0 x 4.0  cm.  The cervical os is fish-mouth, 1.1 cm.  The ectocervix is pink-tan,  smooth, and glistening.  The endocervical canal is measures 2.3 cm in length  and appears normal.  The endometrial cavity is disrupted.  The possible  endometrium measures up to 0.1 cm in greatest thickness.  No gross lesions are  identified.  The myometrium is pink, rubbery and disrupted. The myometrium is  distorted by tan-white intramural nodule with whorled, rubbery, and bulging cut  surfaces. Areas of calcification, necrosis, hemorrhage, softening are not  identified.  The serosal surface is tan-red, smooth, and glistening. Fallopian  tube #1, 6.5 cm in length by 0.7 cm in diameter, has a pink-tan, " smooth,  glistening, hyperemic serosal surface.  The fimbriated end is unrema                          rkable. The  cut surface reveals a patent lumen. Fallopian tube #2, 7.8 cm in length by 0.6  cm in diameter, has a pink-tan, smooth, glistening, hyperemic serosal surface.   The fimbriated end is unremarkable. The cut surface reveals a patent lumen.  Representative sections are submitted in 7 cassettes.   MY    Summary of Cassettes:  Specimen     Label     Site  A          1     cervix            2     fallopian tube #1, fimbriated end entirely and representative  cross-sections                            3           fallopian tube #2, fimbriated end  entirely and representative cross-sections                            4           possible endometrium                            5-7       representative section of myometrium with  nodules                  CONVERTED FINAL REPORT P* 05/14/2020 [id number]         Review of Systems   Musculoskeletal:  Positive for back pain.       Objective   Physical Exam  General: Patient is alert and orient x3 and appears in no acute distress.  Some pain distress.    Neck: Decreased range of motion in all planes    Heart: Regular rate and rhythm no murmurs clicks or gallops    Lungs: Clear to auscultation bilaterally without rhonchi rales or wheezing      Musculoskeletal: Strength was grossly intact.  Deep tendon reflexes intact.  Sensation intact.  Decreased range of motion    Osteopathic structural:  In the head region there is increased suboccipital tension  In the cervical region C2 extended rotated right side bent left  In the rib region first rib on the left was tender to palpation resisted exhalation  In the upper extremity  right upper extremity was protracted inferior tension of pectoralis minor  In the thoracic region  T2 was extended rotated right side bent right, T7 flexed rotated right side bent right  In the lumbar region L5  extended rotated right side bent right  In the pelvic region  positive pelvic compression test on the right, ASIS inferior the right, PSIS superior on the right  In the sacral region  posterior sacral sulcus on the left, MARIA G posterior on the left, negative spring test    Assessment/Plan   Problem List Items Addressed This Visit    None    Assessment & Plan  1. Back pain.  - Her back pain may be exacerbated by exposure to cold weather and potential allergens in her current environment.  - Reports that her  rubbed her back, which provided some relief.  - Advised to continue using ibuprofen as needed for pain management.  - Prescriptions for ibuprofen and mupirocin have been sent to pharmacy.      1. Back and neck pain.  - Reports persistent back and neck pain.  - Trigger point identified during examination.  - Discussed the option of a trigger point injection if pain does not improve.  - Recommended seeing a massage therapist for further relief.         Osteopathic manipulative therapy was utilized  In the head region as suboccipital release  In the cervical region as functional positional release  In the upper extremity as muscle energy  In the rib region as functional positional release  In the Thoracics as high velocity low amplitude thrust and muscle energy  In the lumbar region as functional positional release  In the pelvic region as muscle energy  In the sacral region as muscle energy      Patient tolerated the procedure well and noticed improvement after the treatment.    Instructed to drink plenty of water

## 2025-06-25 ENCOUNTER — APPOINTMENT (OUTPATIENT)
Dept: PRIMARY CARE | Facility: CLINIC | Age: 55
End: 2025-06-25

## 2025-06-25 VITALS
DIASTOLIC BLOOD PRESSURE: 60 MMHG | TEMPERATURE: 98.1 F | OXYGEN SATURATION: 97 % | HEART RATE: 83 BPM | BODY MASS INDEX: 22.54 KG/M2 | WEIGHT: 161 LBS | HEIGHT: 71 IN | SYSTOLIC BLOOD PRESSURE: 124 MMHG

## 2025-06-25 DIAGNOSIS — M54.2 CERVICALGIA: Primary | ICD-10-CM

## 2025-06-25 DIAGNOSIS — M96.1 FAILED BACK SYNDROME: ICD-10-CM

## 2025-06-25 DIAGNOSIS — Z12.31 ENCOUNTER FOR SCREENING MAMMOGRAM FOR BREAST CANCER: ICD-10-CM

## 2025-06-25 PROCEDURE — 99213 OFFICE O/P EST LOW 20 MIN: CPT | Performed by: FAMILY MEDICINE

## 2025-07-16 ENCOUNTER — APPOINTMENT (OUTPATIENT)
Dept: PRIMARY CARE | Facility: CLINIC | Age: 55
End: 2025-07-16

## 2025-07-16 VITALS
HEIGHT: 71 IN | DIASTOLIC BLOOD PRESSURE: 70 MMHG | OXYGEN SATURATION: 95 % | WEIGHT: 161 LBS | HEART RATE: 67 BPM | BODY MASS INDEX: 22.54 KG/M2 | TEMPERATURE: 97.4 F | SYSTOLIC BLOOD PRESSURE: 128 MMHG

## 2025-07-16 DIAGNOSIS — M96.1 FAILED BACK SYNDROME: Primary | ICD-10-CM

## 2025-07-16 PROCEDURE — 99213 OFFICE O/P EST LOW 20 MIN: CPT | Performed by: FAMILY MEDICINE

## 2025-07-16 ASSESSMENT — ENCOUNTER SYMPTOMS: BACK PAIN: 1

## 2025-07-17 NOTE — PROGRESS NOTES
Subjective   Patient ID: Kinjal Cleaning is a 55 y.o. female who presents for Back Pain (Adjustment for back pain).  Back Pain  This is a chronic problem. The current episode started more than 1 month ago. The problem occurs daily. The problem has been waxing and waning since onset. The pain is present in the lumbar spine and thoracic spine. The pain is at a severity of 5/10.     History of Present Illness  The patient presents for evaluation of back and neck pain.    She reports experiencing discomfort in her back and neck, with no change in her lower back condition. She has not sought the services of a massage therapist for these issues.     Results       No visits with results within 1 Month(s) from this visit.   Latest known visit with results is:   [facility]acy Encounter on 05/14/2020   Component Date Value    Pathology Report 05/14/2020                      Value:Name KINJAL CLEANING.                                                                                                   Accession #: [id number]            Pathologist:                   MELANY LE MD  Date of Procedure:    5/14/2020  Date Received:          5/14/2020  Date Reported           5/19/2020  Submitting Physician:   LETTY DENSON MD  Location:                    [facility]  Other External # [id number]                                                                   FINAL DIAGNOSIS  A.  UTERUS, CERVICAL CORE, FALLOPIAN TUBES:    --PROLIFERATIVE PATTERN ENDOMETRIUM  --MYOMETRIUM WITH ADENOMYOSIS  --PORTION OF CERVIX, NO PATHOLOGIC DIAGNOSIS  --FALLOPIAN TUBES, NO PATHOLOGIC DIAGNOSIS                                                                                                                                                                                                                                                                                                                                                                                    "                                                                                                                              Electronically Signed Out By MELANY LE MD/WILEY  By the signature on this report, the individual or group listed as making the  Final Interpretation/Diagnosis certifies that they have reviewed this case.           Clinical History:  YOLANDA, uterovaginal prolapse, cystocele      Specimens Submitted As:  A: UTERUS, CERVICAL CORE (STITCH MARKS ECTOCERVIX), FALLOPIAN TUBES       Other Case Numbers   [id number]  Gross Description:  A:  Received in formalin, labeled with the patient's name and hospital number  and \"uterus, cervical core, tubes\", is a fragmented uterus with fallopian tubes  and cervical core.  The uterus weighs 27 g, and aggregates to 12 x 8.0 x 4.0  cm.  The cervical os is fish-mouth, 1.1 cm.  The ectocervix is pink-tan,  smooth, and glistening.  The endocervical canal is measures 2.3 cm in lengt                          h  and appears normal.  The endometrial cavity is disrupted.  The possible  endometrium measures up to 0.1 cm in greatest thickness.  No gross lesions are  identified.  The myometrium is pink, rubbery and disrupted. The myometrium is  distorted by tan-white intramural nodule with whorled, rubbery, and bulging cut  surfaces. Areas of calcification, necrosis, hemorrhage, softening are not  identified.  The serosal surface is tan-red, smooth, and glistening. Fallopian  tube #1, 6.5 cm in length by 0.7 cm in diameter, has a pink-tan, smooth,  glistening, hyperemic serosal surface.  The fimbriated end is unremarkable. The  cut surface reveals a patent lumen. Fallopian tube #2, 7.8 cm in length by 0.6  cm in diameter, has a pink-tan, smooth, glistening, hyperemic serosal surface.   The fimbriated end is unremarkable. The cut surface reveals a patent lumen.  Representative sections are submitted in 7 cassettes.   MY    Summary of Cassettes:  Specimen     Label     " "Site  A          1     cervi                          x            2     fallopian tube #1, fimbriated end entirely and representative  cross-sections                            3           fallopian tube #2, fimbriated end  entirely and representative cross-sections                            4           possible endometrium                            5-7       representative section of myometrium with  nodules                my/5/15/2020               [facility]  Department of Pathology   [id number] [place] [address]        CONVERTED FINAL DIAGNOSIS 05/14/2020                      Value:A.  UTERUS, CERVICAL CORE, FALLOPIAN TUBES:    --PROLIFERATIVE PATTERN ENDOMETRIUM  --MYOMETRIUM WITH ADENOMYOSIS  --PORTION OF CERVIX, NO PATHOLOGIC DIAGNOSIS  --FALLOPIAN TUBES, NO PATHOLOGIC DIAGNOSIS        CONVERTED CLINICAL DIAGN* 05/14/2020                      Value:YOLANDA, uterovaginal prolapse, cystocele        CONVERTED GROSS DESCRIPT* 05/14/2020                      Value:A:  Received in formalin, labeled with the patient's name and hospital number  and \"uterus, cervical core, tubes\", is a fragmented uterus with fallopian tubes  and cervical core.  The uterus weighs 27 g, and aggregates to 12 x 8.0 x 4.0  cm.  The cervical os is fish-mouth, 1.1 cm.  The ectocervix is pink-tan,  smooth, and glistening.  The endocervical canal is measures 2.3 cm in length  and appears normal.  The endometrial cavity is disrupted.  The possible  endometrium measures up to 0.1 cm in greatest thickness.  No gross lesions are  identified.  The myometrium is pink, rubbery and disrupted. The myometrium is  distorted by tan-white intramural nodule with whorled, rubbery, and bulging cut  surfaces. Areas of calcification, necrosis, hemorrhage, softening are not  identified.  The serosal surface is tan-red, smooth, and glistening. Fallopian  tube #1, 6.5 cm in length by 0.7 cm in diameter, has a pink-tan, " smooth,  glistening, hyperemic serosal surface.  The fimbriated end is unrema                          rkable. The  cut surface reveals a patent lumen. Fallopian tube #2, 7.8 cm in length by 0.6  cm in diameter, has a pink-tan, smooth, glistening, hyperemic serosal surface.   The fimbriated end is unremarkable. The cut surface reveals a patent lumen.  Representative sections are submitted in 7 cassettes.   MY    Summary of Cassettes:  Specimen     Label     Site  A          1     cervix            2     fallopian tube #1, fimbriated end entirely and representative  cross-sections                            3           fallopian tube #2, fimbriated end  entirely and representative cross-sections                            4           possible endometrium                            5-7       representative section of myometrium with  nodules                  CONVERTED FINAL REPORT P* 05/14/2020 [id number]         Review of Systems   Musculoskeletal:  Positive for back pain.       Objective   Physical Exam  General: Patient is alert and orient x3 and appears in no acute distress.  Some pain distress.    Neck: Decreased range of motion in all planes    Heart: Regular rate and rhythm no murmurs clicks or gallops    Lungs: Clear to auscultation bilaterally without rhonchi rales or wheezing      Musculoskeletal: Strength was grossly intact.  Deep tendon reflexes intact.  Sensation intact.  Decreased range of motion    Osteopathic structural:  In the head region there is increased suboccipital tension  In the cervical region C2 extended rotated right side bent left  In the rib region first rib on the left was tender to palpation resisted exhalation  In the upper extremity  right upper extremity was protracted inferior tension of pectoralis minor  In the thoracic region  T2 was extended rotated right side bent right, T7 flexed rotated right side bent right  In the lumbar region L5  extended rotated right side bent right  In the pelvic region  positive pelvic compression test on the right, ASIS inferior the right, PSIS superior on the right  In the sacral region  posterior sacral sulcus on the left, MARIA G posterior on the left, negative spring test    Assessment/Plan   Problem List Items Addressed This Visit    None    Assessment & Plan  1. Back pain.  - Her back pain may be exacerbated by exposure to cold weather and potential allergens in her current environment.  - Reports that her  rubbed her back, which provided some relief.  - Advised to continue using ibuprofen as needed for pain management.  - Prescriptions for ibuprofen and mupirocin have been sent to pharmacy.      1. Back and neck pain.  - Reports persistent back and neck pain.  - Trigger point identified during examination.  - Discussed the option of a trigger point injection if pain does not improve.  - Recommended seeing a massage therapist for further relief.         Osteopathic manipulative therapy was utilized  In the head region as suboccipital release  In the cervical region as functional positional release  In the upper extremity as muscle energy  In the rib region as functional positional release  In the Thoracics as high velocity low amplitude thrust and muscle energy  In the lumbar region as functional positional release  In the pelvic region as muscle energy  In the sacral region as muscle energy      Patient tolerated the procedure well and noticed improvement after the treatment.    Instructed to drink plenty of water

## 2025-07-23 ENCOUNTER — APPOINTMENT (OUTPATIENT)
Dept: PRIMARY CARE | Facility: CLINIC | Age: 55
End: 2025-07-23

## 2025-07-28 ENCOUNTER — APPOINTMENT (OUTPATIENT)
Dept: PRIMARY CARE | Facility: CLINIC | Age: 55
End: 2025-07-28

## 2025-08-19 ASSESSMENT — ENCOUNTER SYMPTOMS: BACK PAIN: 1

## 2025-08-20 ENCOUNTER — APPOINTMENT (OUTPATIENT)
Dept: PRIMARY CARE | Facility: CLINIC | Age: 55
End: 2025-08-20

## 2025-08-20 VITALS
WEIGHT: 161 LBS | TEMPERATURE: 97.6 F | BODY MASS INDEX: 22.54 KG/M2 | HEIGHT: 71 IN | OXYGEN SATURATION: 97 % | DIASTOLIC BLOOD PRESSURE: 68 MMHG | SYSTOLIC BLOOD PRESSURE: 126 MMHG | HEART RATE: 68 BPM

## 2025-08-20 DIAGNOSIS — M96.1 FAILED BACK SYNDROME: Primary | ICD-10-CM

## 2025-08-20 DIAGNOSIS — M54.2 CERVICALGIA: ICD-10-CM

## 2025-08-20 PROCEDURE — 99213 OFFICE O/P EST LOW 20 MIN: CPT | Performed by: FAMILY MEDICINE

## 2025-08-20 ASSESSMENT — PATIENT HEALTH QUESTIONNAIRE - PHQ9
SUM OF ALL RESPONSES TO PHQ9 QUESTIONS 1 AND 2: 0
1. LITTLE INTEREST OR PLEASURE IN DOING THINGS: NOT AT ALL
2. FEELING DOWN, DEPRESSED OR HOPELESS: NOT AT ALL

## 2025-08-20 ASSESSMENT — ENCOUNTER SYMPTOMS: BACK PAIN: 1

## 2025-08-25 ENCOUNTER — APPOINTMENT (OUTPATIENT)
Dept: PRIMARY CARE | Facility: CLINIC | Age: 55
End: 2025-08-25

## 2025-09-17 ENCOUNTER — APPOINTMENT (OUTPATIENT)
Dept: PRIMARY CARE | Facility: CLINIC | Age: 55
End: 2025-09-17

## 2025-09-17 DIAGNOSIS — M54.2 CERVICALGIA: ICD-10-CM

## 2025-09-17 DIAGNOSIS — M96.1 FAILED BACK SYNDROME: Primary | ICD-10-CM

## 2025-09-29 ENCOUNTER — APPOINTMENT (OUTPATIENT)
Dept: PRIMARY CARE | Facility: CLINIC | Age: 55
End: 2025-09-29

## 2025-10-15 ENCOUNTER — APPOINTMENT (OUTPATIENT)
Dept: PRIMARY CARE | Facility: CLINIC | Age: 55
End: 2025-10-15

## 2025-10-27 ENCOUNTER — APPOINTMENT (OUTPATIENT)
Dept: PRIMARY CARE | Facility: CLINIC | Age: 55
End: 2025-10-27

## 2025-11-12 ENCOUNTER — APPOINTMENT (OUTPATIENT)
Dept: PRIMARY CARE | Facility: CLINIC | Age: 55
End: 2025-11-12

## 2025-11-24 ENCOUNTER — APPOINTMENT (OUTPATIENT)
Dept: PRIMARY CARE | Facility: CLINIC | Age: 55
End: 2025-11-24

## 2025-12-03 ENCOUNTER — APPOINTMENT (OUTPATIENT)
Dept: PRIMARY CARE | Facility: CLINIC | Age: 55
End: 2025-12-03

## 2025-12-17 ENCOUNTER — APPOINTMENT (OUTPATIENT)
Dept: PRIMARY CARE | Facility: CLINIC | Age: 55
End: 2025-12-17

## 2025-12-23 ENCOUNTER — APPOINTMENT (OUTPATIENT)
Dept: PRIMARY CARE | Facility: CLINIC | Age: 55
End: 2025-12-23

## 2026-01-07 ENCOUNTER — APPOINTMENT (OUTPATIENT)
Dept: PRIMARY CARE | Facility: CLINIC | Age: 56
End: 2026-01-07

## 2026-02-04 ENCOUNTER — APPOINTMENT (OUTPATIENT)
Dept: PRIMARY CARE | Facility: CLINIC | Age: 56
End: 2026-02-04

## 2026-03-04 ENCOUNTER — APPOINTMENT (OUTPATIENT)
Dept: PRIMARY CARE | Facility: CLINIC | Age: 56
End: 2026-03-04

## 2026-04-01 ENCOUNTER — APPOINTMENT (OUTPATIENT)
Dept: PRIMARY CARE | Facility: CLINIC | Age: 56
End: 2026-04-01

## 2026-05-06 ENCOUNTER — APPOINTMENT (OUTPATIENT)
Dept: PRIMARY CARE | Facility: CLINIC | Age: 56
End: 2026-05-06

## 2026-06-03 ENCOUNTER — APPOINTMENT (OUTPATIENT)
Dept: PRIMARY CARE | Facility: CLINIC | Age: 56
End: 2026-06-03

## 2026-07-01 ENCOUNTER — APPOINTMENT (OUTPATIENT)
Dept: PRIMARY CARE | Facility: CLINIC | Age: 56
End: 2026-07-01